# Patient Record
Sex: FEMALE | Employment: UNEMPLOYED | URBAN - METROPOLITAN AREA
[De-identification: names, ages, dates, MRNs, and addresses within clinical notes are randomized per-mention and may not be internally consistent; named-entity substitution may affect disease eponyms.]

---

## 2017-06-01 ENCOUNTER — OFFICE VISIT (OUTPATIENT)
Dept: INTERNAL MEDICINE CLINIC | Age: 37
End: 2017-06-01

## 2017-06-01 VITALS
WEIGHT: 211.7 LBS | SYSTOLIC BLOOD PRESSURE: 118 MMHG | TEMPERATURE: 98.6 F | HEART RATE: 77 BPM | BODY MASS INDEX: 34.02 KG/M2 | OXYGEN SATURATION: 98 % | HEIGHT: 66 IN | DIASTOLIC BLOOD PRESSURE: 84 MMHG | RESPIRATION RATE: 16 BRPM

## 2017-06-01 DIAGNOSIS — Z00.00 ROUTINE PHYSICAL EXAMINATION: Primary | ICD-10-CM

## 2017-06-01 DIAGNOSIS — R00.2 PALPITATIONS: ICD-10-CM

## 2017-06-01 DIAGNOSIS — L28.2 PRURITIC RASH: ICD-10-CM

## 2017-06-01 DIAGNOSIS — R07.89 OTHER CHEST PAIN: ICD-10-CM

## 2017-06-01 RX ORDER — MENTHOL
1000 GEL (GRAM) TOPICAL DAILY
COMMUNITY

## 2017-06-01 RX ORDER — NORGESTIMATE AND ETHINYL ESTRADIOL 0.25-0.035
1 KIT ORAL DAILY
COMMUNITY
Start: 2017-05-19

## 2017-06-01 RX ORDER — TRIAMCINOLONE ACETONIDE 1 MG/G
OINTMENT TOPICAL 2 TIMES DAILY
Qty: 80 G | Refills: 1 | Status: SHIPPED | OUTPATIENT
Start: 2017-06-01

## 2017-06-01 RX ORDER — BISMUTH SUBSALICYLATE 262 MG
1 TABLET,CHEWABLE ORAL DAILY
COMMUNITY

## 2017-06-01 NOTE — PROGRESS NOTES
Chief Complaint   Patient presents with    Physical    Rash     on lower legs causing itching. for the past few mo     intermittent chest pain for the past few mo. Denies hx of anxiety, SOB. Does not know what is causing them. Comes on as tightness, like palpitations, once she deep breathes, they go away. Health Maintenance Due   Topic Date Due    DTaP/Tdap/Td series (1 - Tdap) 12/17/2002    PAP AKA CERVICAL CYTOLOGY  12/17/2002   last pap 08/2015 with [facility]  Coordination of Care Questions    1. Have you been to the ER, urgent care clinic since your last visit? No       Hospitalized since your last visit? No    2. Have you seen or consulted any other health care providers outside of the [address] [place] [address] since your last visit? Include any pap smears or colon screening.  No

## 2017-06-02 LAB
ALBUMIN SERPL-MCNC: 4.4 G/DL (ref 3.5–5.5)
ALBUMIN/GLOB SERPL: 1.5 {RATIO} (ref 1.2–2.2)
ALP SERPL-CCNC: 67 IU/L (ref 39–117)
ALT SERPL-CCNC: 25 IU/L (ref 0–32)
AST SERPL-CCNC: 22 IU/L (ref 0–40)
BASOPHILS # BLD AUTO: 0 X10E3/UL (ref 0–0.2)
BASOPHILS NFR BLD AUTO: 0 %
BILIRUB SERPL-MCNC: 0.2 MG/DL (ref 0–1.2)
BUN SERPL-MCNC: 7 MG/DL (ref 6–20)
BUN/CREAT SERPL: 8 (ref 9–23)
CALCIUM SERPL-MCNC: 9.5 MG/DL (ref 8.7–10.2)
CHLORIDE SERPL-SCNC: 99 MMOL/L (ref 96–106)
CO2 SERPL-SCNC: 25 MMOL/L (ref 18–29)
CREAT SERPL-MCNC: 0.87 MG/DL (ref 0.57–1)
EOSINOPHIL # BLD AUTO: 0.2 X10E3/UL (ref 0–0.4)
EOSINOPHIL NFR BLD AUTO: 2 %
ERYTHROCYTE [DISTWIDTH] IN BLOOD BY AUTOMATED COUNT: 20 % (ref 12.3–15.4)
GLOBULIN SER CALC-MCNC: 3 G/DL (ref 1.5–4.5)
GLUCOSE SERPL-MCNC: 79 MG/DL (ref 65–99)
HCT VFR BLD AUTO: 38.4 % (ref 34–46.6)
HGB BLD-MCNC: 12.2 G/DL (ref 11.1–15.9)
IMM GRANULOCYTES # BLD: 0 X10E3/UL (ref 0–0.1)
IMM GRANULOCYTES NFR BLD: 0 %
LYMPHOCYTES # BLD AUTO: 3.2 X10E3/UL (ref 0.7–3.1)
LYMPHOCYTES NFR BLD AUTO: 45 %
MCH RBC QN AUTO: 23.5 PG (ref 26.6–33)
MCHC RBC AUTO-ENTMCNC: 31.8 G/DL (ref 31.5–35.7)
MCV RBC AUTO: 74 FL (ref 79–97)
MONOCYTES # BLD AUTO: 0.4 X10E3/UL (ref 0.1–0.9)
MONOCYTES NFR BLD AUTO: 6 %
NEUTROPHILS # BLD AUTO: 3.3 X10E3/UL (ref 1.4–7)
NEUTROPHILS NFR BLD AUTO: 47 %
PLATELET # BLD AUTO: 332 X10E3/UL (ref 150–379)
POTASSIUM SERPL-SCNC: 4.5 MMOL/L (ref 3.5–5.2)
PROT SERPL-MCNC: 7.4 G/DL (ref 6–8.5)
RBC # BLD AUTO: 5.19 X10E6/UL (ref 3.77–5.28)
SODIUM SERPL-SCNC: 139 MMOL/L (ref 134–144)
TSH SERPL DL<=0.005 MIU/L-ACNC: 0.99 UIU/ML (ref 0.45–4.5)
WBC # BLD AUTO: 7.1 X10E3/UL (ref 3.4–10.8)

## 2017-06-02 NOTE — PROGRESS NOTES
Subjective:   29 y.o. female for Well Woman Check. She is not fasting and will return for the lipid panel another day. She has an itchy rash on the lower legs (urticarial) without other concerns. No known exposures. No additional concerns. Last eye exam greater than 1 year. Her gyne and breast care is done elsewhere by her Ob-Gyne physician. There is no problem list on file for this patient. There are no active problems to display for this patient. Current Outpatient Prescriptions   Medication Sig Dispense Refill    multivitamin (ONE A DAY) tablet Take 1 Tab by mouth daily.  ferrous sulfate ER (IRON) 160 mg (50 mg iron) TbER tablet Take 1 Tab by mouth daily.  ASCORBIC ACID/VITAMIN E/BIOTIN (HAIR, SKIN, NAILS WITH BIOTIN PO) Take  by mouth.  vitamin e (E GEMS) 1,000 unit capsule Take 1,000 Units by mouth daily.  SPRINTEC, 28, 0.25-35 mg-mcg tab Take 1 Tab by mouth daily.  triamcinolone acetonide (KENALOG) 0.1 % ointment Apply  to affected area two (2) times a day. use thin layer 80 g 1    Cholecalciferol, Vitamin D3, (VITAMIN D3) 1,000 unit cap Take  by mouth. No Known Allergies  History reviewed. No pertinent past medical history. History reviewed. No pertinent surgical history.   Family History   Problem Relation Age of Onset    Hypertension Mother      Social History   Substance Use Topics    Smoking status: Former Smoker     Quit date: 1/1/2013    Smokeless tobacco: Never Used    Alcohol use Yes      Comment: occ        Lab Results  Component Value Date/Time   WBC 5.5 10/08/2015 03:06 PM   HGB 12.2 10/08/2015 03:06 PM   HCT 37.4 10/08/2015 03:06 PM   PLATELET 517 61/24/2275 03:06 PM   MCV 73 10/08/2015 03:06 PM       Lab Results  Component Value Date/Time   Glucose 82 10/08/2015 03:06 PM   LDL, calculated 78 04/15/2015 10:33 AM   Creatinine 0.99 10/08/2015 03:06 PM      Lab Results  Component Value Date/Time   Cholesterol, total 131 04/15/2015 10:33 AM   HDL Cholesterol 34 04/15/2015 10:33 AM   LDL, calculated 78 04/15/2015 10:33 AM   Triglyceride 93 04/15/2015 10:33 AM       Lab Results  Component Value Date/Time   ALT (SGPT) 13 10/08/2015 03:06 PM   AST (SGOT) 16 10/08/2015 03:06 PM   Alk. phosphatase 68 10/08/2015 03:06 PM   Bilirubin, total <0.2 10/08/2015 03:06 PM       No results found for: INR, PTMR, PTP, PT1, PT2   Lab Results  Component Value Date/Time   GFR est AA 87 10/08/2015 03:06 PM   GFR est non-AA 75 10/08/2015 03:06 PM   Creatinine 0.99 10/08/2015 03:06 PM   BUN 9 10/08/2015 03:06 PM   Sodium 139 10/08/2015 03:06 PM   Potassium 4.9 10/08/2015 03:06 PM   Chloride 102 10/08/2015 03:06 PM   CO2 22 10/08/2015 03:06 PM      Lab Results  Component Value Date/Time   TSH 0.975 10/08/2015 03:06 PM      Lab Results   Component Value Date/Time    Glucose 82 10/08/2015 03:06 PM     There is no problem list on file for this patient. No Known Allergies  Current Outpatient Prescriptions on File Prior to Visit   Medication Sig Dispense Refill    Cholecalciferol, Vitamin D3, (VITAMIN D3) 1,000 unit cap Take  by mouth. No current facility-administered medications on file prior to visit. History reviewed. No pertinent past medical history. History reviewed. No pertinent surgical history. Social History     Social History    Marital status: SINGLE     Spouse name: N/A    Number of children: N/A    Years of education: N/A     Occupational History    Not on file. Social History Main Topics    Smoking status: Former Smoker     Quit date: 1/1/2013    Smokeless tobacco: Never Used    Alcohol use Yes      Comment: occ    Drug use: No    Sexual activity: Not Currently     Other Topics Concern    Not on file     Social History Narrative     Family History   Problem Relation Age of Onset    Hypertension Mother      This note will not be viewable in [address]     If Narcotics or controlled substances were prescribed, I personally reviewed the patient  history. ROS: Feeling generally well. No TIA's or unusual headaches, no dysphagia. No prolonged cough. No dyspnea or chest pain on exertion. No abdominal pain, change in bowel habits, black or bloody stools. No urinary tract symptoms. No new or unusual musculoskeletal symptoms. Specific concerns today: rash to lower legs. Objective: The patient appears well, alert, oriented x 3, in no distress. Visit Vitals    /84 (BP 1 Location: Right arm, BP Patient Position: Sitting)    Pulse 77    Temp 98.6 °F (37 °C) (Oral)    Resp 16    Ht 5' 6\" (1.676 m)    Wt 211 lb 11.2 oz (96 kg)    LMP 05/27/2017 (Exact Date)    SpO2 98%    BMI 34.17 kg/m2     ENT normal.  Neck supple. No adenopathy or thyromegaly. CARRI. Lungs are clear, good air entry, no wheezes, rhonchi or rales. S1 and S2 normal, no murmurs, regular rate and rhythm. Abdomen soft without tenderness, guarding, mass or organomegaly. Extremities show no edema, normal peripheral pulses. Neurological is normal, no focal findings. Breast and Pelvic exams are deferred. She has a urticarial rash note sporadically on the lower extremities without drainage or pustules. Mild erythema. No edema or cellulitis noted. Assessment/Plan:   Well Woman examination performed. Encouraged her to lose weight, increase physical activity, follow low fat diet and follow up every 6 months. Routine labs ordered and obtained (she will return for lipid panel when fasting). Will notify patient of results and adjust treatment plan as indicated. Triamcinolone cream prescribed. Medication benefits, risks, indication, dosage, potential side effects and alternate medication options were discussed with patient who expressed understanding. Encouraged her to call if any problems. Patient expressed understanding of instructions and agreement with treatment plan. ICD-10-CM ICD-9-CM    1.  Routine physical examination O24.51 B81.0 METABOLIC PANEL, COMPREHENSIVE CBC WITH AUTOMATED DIFF      TSH 3RD GENERATION   2.  Other chest pain R07.89 786.59 AMB POC EKG ROUTINE W/ 12 LEADS, INTER & REP      REFERRAL TO CARDIOLOGY   3. Palpitations R00.2 785.1 AMB POC EKG ROUTINE W/ 12 LEADS, INTER & REP      REFERRAL TO CARDIOLOGY   4. Pruritic rash L28.2 698.2 triamcinolone acetonide (KENALOG) 0.1 % ointment

## 2017-06-02 NOTE — PATIENT INSTRUCTIONS
Thank you for choosing [address]. We know you have many options when it comes to your healthcare; we appreciate that you picked us. Our goal is to provide exceptional  service and world class care for every patient. You may receive a survey in the mail or by email asking for your feedback. Please take a few minutes to share your thoughts about your recent visit. Your comments helps us understand what we do well and what we can do better. To ensure confidentiality, this survey is administered by an independent third-party, [employer] participation will help us to improve the quality of care that we provide to you, your family, friends, and neighbors. Thank you! Well Visit, Ages 25 to 48: Care Instructions  Your Care Instructions  Physical exams can help you stay healthy. Your doctor has checked your overall health and may have suggested ways to take good care of yourself. He or she also may have recommended tests. At home, you can help prevent illness with healthy eating, regular exercise, and other steps. Follow-up care is a key part of your treatment and safety. Be sure to make and go to all appointments, and call your doctor if you are having problems. It's also a good idea to know your test results and keep a list of the medicines you take. How can you care for yourself at home? · Reach and stay at a healthy weight. This will lower your risk for many problems, such as obesity, diabetes, heart disease, and high blood pressure. · Get at least 30 minutes of physical activity on most days of the week. Walking is a good choice. You also may want to do other activities, such as running, swimming, cycling, or playing tennis or team sports. Discuss any changes in your exercise program with your doctor. · Do not smoke or allow others to smoke around you. If you need help quitting, talk to your doctor about stop-smoking programs and medicines.  These can increase your chances of quitting for good. · Talk to your doctor about whether you have any risk factors for sexually transmitted infections (STIs). Having one sex partner (who does not have STIs and does not have sex with anyone else) is a good way to avoid these infections. · Use birth control if you do not want to have children at this time. Talk with your doctor about the choices available and what might be best for you. · Protect your skin from too much sun. When you're outdoors from 10 a.m. to 4 p.m., stay in the shade or cover up with clothing and a hat with a wide brim. Wear sunglasses that block UV rays. Even when it's cloudy, put broad-spectrum sunscreen (SPF 30 or higher) on any exposed skin. · See a dentist one or two times a year for checkups and to have your teeth cleaned. · Wear a seat belt in the car. · Drink alcohol in moderation, if at all. That means no more than 2 drinks a day for men and 1 drink a day for women. Follow your doctor's advice about when to have certain tests. These tests can spot problems early. For everyone  · Cholesterol. Have the fat (cholesterol) in your blood tested after age 21. Your doctor will tell you how often to have this done based on your age, family history, or other things that can increase your risk for heart disease. · Blood pressure. Have your blood pressure checked during a routine doctor visit. Your doctor will tell you how often to check your blood pressure based on your age, your blood pressure results, and other factors. · Vision. Talk with your doctor about how often to have a glaucoma test.  · Diabetes. Ask your doctor whether you should have tests for diabetes. · Colon cancer. Have a test for colon cancer at age 48. You may have one of several tests. If you are younger than 48, you may need a test earlier if you have any risk factors.  Risk factors include whether you already had a precancerous polyp removed from your colon or whether your parent, brother, sister, or child has had colon cancer. For women  · Breast exam and mammogram. Talk to your doctor about when you should have a clinical breast exam and a mammogram. Medical experts differ on whether and how often women under 50 should have these tests. Your doctor can help you decide what is right for you. · Pap test and pelvic exam. Begin Pap tests at age 24. A Pap test is the best way to find cervical cancer. The test often is part of a pelvic exam. Ask how often to have this test.  · Tests for sexually transmitted infections (STIs). Ask whether you should have tests for STIs. You may be at risk if you have sex with more than one person, especially if your partners do not wear condoms. For men  · Tests for sexually transmitted infections (STIs). Ask whether you should have tests for STIs. You may be at risk if you have sex with more than one person, especially if you do not wear a condom. · Testicular cancer exam. Ask your doctor whether you should check your testicles regularly. · Prostate exam. Talk to your doctor about whether you should have a blood test (called a PSA test) for prostate cancer. Experts differ on whether and when men should have this test. Some experts suggest it if you are older than 39 and are -American or have a father or brother who got prostate cancer when he was younger than 72. When should you call for help? Watch closely for changes in your health, and be sure to contact your doctor if you have any problems or symptoms that concern you. Where can you learn more? Go to http://ijeoma-robert.info/. Enter P072 in the search box to learn more about \"Well Visit, Ages 25 to 48: Care Instructions. \"  Current as of: July 19, 2016  Content Version: 11.2  © 4698-3708 [employer]. Care instructions adapted under license by [employer] (which disclaims liability or warranty for this information).  If you have questions about a medical condition or this instruction, always ask your healthcare professional. [place] [address] any warranty or liability for your use of this information.

## 2018-05-14 ENCOUNTER — HOSPITAL ENCOUNTER (OUTPATIENT)
Dept: MAMMOGRAPHY | Age: 38
Discharge: HOME | End: 2018-05-14
Attending: OBSTETRICS & GYNECOLOGY
Payer: COMMERCIAL

## 2018-05-14 ENCOUNTER — HOSPITAL ENCOUNTER (OUTPATIENT)
Dept: ULTRASOUND IMAGING | Age: 38
Discharge: HOME | End: 2018-05-14
Attending: OBSTETRICS & GYNECOLOGY
Payer: COMMERCIAL

## 2018-05-14 DIAGNOSIS — N63.0 BREAST LUMP: ICD-10-CM

## 2018-05-14 PROCEDURE — 76642 ULTRASOUND BREAST LIMITED: CPT

## 2018-05-14 PROCEDURE — 77066 DX MAMMO INCL CAD BI: CPT

## 2018-10-07 ENCOUNTER — HOSPITAL ENCOUNTER (EMERGENCY)
Age: 38
Discharge: HOME | End: 2018-10-08
Attending: EMERGENCY MEDICINE | Admitting: EMERGENCY MEDICINE
Payer: SELF-PAY

## 2018-10-07 VITALS
TEMPERATURE: 98.1 F | RESPIRATION RATE: 18 BRPM | OXYGEN SATURATION: 100 % | DIASTOLIC BLOOD PRESSURE: 102 MMHG | BODY MASS INDEX: 34.55 KG/M2 | WEIGHT: 215 LBS | SYSTOLIC BLOOD PRESSURE: 144 MMHG | HEART RATE: 86 BPM | HEIGHT: 66 IN

## 2018-10-07 DIAGNOSIS — R10.30 LOWER ABDOMINAL PAIN: Primary | ICD-10-CM

## 2018-10-07 LAB
BASOPHILS # BLD: 0 K/UL (ref 0–0.1)
BASOPHILS NFR BLD: 1 % (ref 0–1)
DIFFERENTIAL METHOD BLD: ABNORMAL
EOSINOPHIL # BLD: 0.2 K/UL (ref 0–0.4)
EOSINOPHIL NFR BLD: 3 % (ref 0–7)
ERYTHROCYTE [DISTWIDTH] IN BLOOD BY AUTOMATED COUNT: 17.2 % (ref 11.5–14.5)
HCT VFR BLD AUTO: 35 % (ref 35–47)
HGB BLD-MCNC: 11.5 G/DL (ref 11.5–16)
IMM GRANULOCYTES # BLD: 0 K/UL (ref 0–0.04)
IMM GRANULOCYTES NFR BLD AUTO: 0 % (ref 0–0.5)
LYMPHOCYTES # BLD: 3 K/UL (ref 0.8–3.5)
LYMPHOCYTES NFR BLD: 39 % (ref 12–49)
MCH RBC QN AUTO: 24.5 PG (ref 26–34)
MCHC RBC AUTO-ENTMCNC: 32.9 G/DL (ref 30–36.5)
MCV RBC AUTO: 74.6 FL (ref 80–99)
MONOCYTES # BLD: 0.8 K/UL (ref 0–1)
MONOCYTES NFR BLD: 11 % (ref 5–13)
NEUTS SEG # BLD: 3.5 K/UL (ref 1.8–8)
NEUTS SEG NFR BLD: 46 % (ref 32–75)
NRBC # BLD: 0 K/UL (ref 0–0.01)
NRBC BLD-RTO: 0 PER 100 WBC
PLATELET # BLD AUTO: 328 K/UL (ref 150–400)
PMV BLD AUTO: 10.8 FL (ref 8.9–12.9)
RBC # BLD AUTO: 4.69 M/UL (ref 3.8–5.2)
WBC # BLD AUTO: 7.6 K/UL (ref 3.6–11)

## 2018-10-07 PROCEDURE — 96361 HYDRATE IV INFUSION ADD-ON: CPT

## 2018-10-07 PROCEDURE — 96374 THER/PROPH/DIAG INJ IV PUSH: CPT

## 2018-10-07 PROCEDURE — 80048 BASIC METABOLIC PNL TOTAL CA: CPT | Performed by: EMERGENCY MEDICINE

## 2018-10-07 PROCEDURE — 74011250636 HC RX REV CODE- 250/636: Performed by: EMERGENCY MEDICINE

## 2018-10-07 PROCEDURE — 85025 COMPLETE CBC W/AUTO DIFF WBC: CPT | Performed by: EMERGENCY MEDICINE

## 2018-10-07 PROCEDURE — 36415 COLL VENOUS BLD VENIPUNCTURE: CPT | Performed by: EMERGENCY MEDICINE

## 2018-10-07 PROCEDURE — 99283 EMERGENCY DEPT VISIT LOW MDM: CPT

## 2018-10-07 RX ORDER — KETOROLAC TROMETHAMINE 30 MG/ML
30 INJECTION, SOLUTION INTRAMUSCULAR; INTRAVENOUS
Status: COMPLETED | OUTPATIENT
Start: 2018-10-07 | End: 2018-10-07

## 2018-10-07 RX ADMIN — SODIUM CHLORIDE 1000 ML: 900 INJECTION, SOLUTION INTRAVENOUS at 23:44

## 2018-10-07 RX ADMIN — KETOROLAC TROMETHAMINE 30 MG: 30 INJECTION, SOLUTION INTRAMUSCULAR; INTRAVENOUS at 23:44

## 2018-10-08 LAB
ANION GAP SERPL CALC-SCNC: 9 MMOL/L (ref 5–15)
APPEARANCE UR: ABNORMAL
BACTERIA URNS QL MICRO: ABNORMAL /HPF
BILIRUB UR QL: NEGATIVE
BUN SERPL-MCNC: 5 MG/DL (ref 6–20)
BUN/CREAT SERPL: 5 (ref 12–20)
CALCIUM SERPL-MCNC: 8.5 MG/DL (ref 8.5–10.1)
CHLORIDE SERPL-SCNC: 106 MMOL/L (ref 97–108)
CO2 SERPL-SCNC: 29 MMOL/L (ref 21–32)
COLOR UR: ABNORMAL
CREAT SERPL-MCNC: 0.95 MG/DL (ref 0.55–1.02)
EPITH CASTS URNS QL MICRO: ABNORMAL /LPF
GLUCOSE SERPL-MCNC: 111 MG/DL (ref 65–100)
GLUCOSE UR STRIP.AUTO-MCNC: NEGATIVE MG/DL
HCG UR QL: NEGATIVE
HGB UR QL STRIP: NEGATIVE
KETONES UR QL STRIP.AUTO: NEGATIVE MG/DL
LEUKOCYTE ESTERASE UR QL STRIP.AUTO: ABNORMAL
NITRITE UR QL STRIP.AUTO: NEGATIVE
PH UR STRIP: 6 [PH] (ref 5–8)
POTASSIUM SERPL-SCNC: 3.9 MMOL/L (ref 3.5–5.1)
PROT UR STRIP-MCNC: NEGATIVE MG/DL
RBC #/AREA URNS HPF: ABNORMAL /HPF (ref 0–5)
SODIUM SERPL-SCNC: 144 MMOL/L (ref 136–145)
SP GR UR REFRACTOMETRY: 1.01 (ref 1–1.03)
UROBILINOGEN UR QL STRIP.AUTO: 0.2 EU/DL (ref 0.2–1)
WBC URNS QL MICRO: ABNORMAL /HPF (ref 0–4)

## 2018-10-08 PROCEDURE — 96361 HYDRATE IV INFUSION ADD-ON: CPT

## 2018-10-08 PROCEDURE — 81025 URINE PREGNANCY TEST: CPT

## 2018-10-08 PROCEDURE — 81001 URINALYSIS AUTO W/SCOPE: CPT | Performed by: EMERGENCY MEDICINE

## 2018-10-08 PROCEDURE — 74011250636 HC RX REV CODE- 250/636: Performed by: EMERGENCY MEDICINE

## 2018-10-08 RX ORDER — TRAMADOL HYDROCHLORIDE 50 MG/1
50 TABLET ORAL
Qty: 12 TAB | Refills: 0 | Status: SHIPPED | OUTPATIENT
Start: 2018-10-08 | End: 2018-10-18

## 2018-10-08 RX ORDER — IBUPROFEN 800 MG/1
800 TABLET ORAL
Qty: 20 TAB | Refills: 0 | Status: SHIPPED | OUTPATIENT
Start: 2018-10-08 | End: 2018-10-15

## 2018-10-08 RX ADMIN — SODIUM CHLORIDE 1000 ML: 900 INJECTION, SOLUTION INTRAVENOUS at 00:19

## 2018-10-08 NOTE — ED NOTES
Patient presents to ED with c/o abdominal and back pain for 3 days. Patient states that she started with nausea 2 hours pta. Denies vomiting and diarrhea. Emergency Department Nursing Plan of Care The Nursing Plan of Care is developed from the Nursing assessment and Emergency Department Attending provider initial evaluation. The plan of care may be reviewed in the ED Provider note. The Plan of Care was developed with the following considerations:  
Patient / Family readiness to learn indicated by:verbalized understanding Persons(s) to be included in education: patient Barriers to Learning/Limitations:No 
 
Signed [address] RN   
10/7/2018   11:25 PM

## 2018-10-08 NOTE — DISCHARGE INSTRUCTIONS
Abdominal Pain: Care Instructions  Your Care Instructions    Abdominal pain has many possible causes. Some aren't serious and get better on their own in a few days. Others need more testing and treatment. If your pain continues or gets worse, you need to be rechecked and may need more tests to find out what is wrong. You may need surgery to correct the problem. Don't ignore new symptoms, such as fever, nausea and vomiting, urination problems, pain that gets worse, and dizziness. These may be signs of a more serious problem. Your doctor may have recommended a follow-up visit in the next 8 to 12 hours. If you are not getting better, you may need more tests or treatment. The doctor has checked you carefully, but problems can develop later. If you notice any problems or new symptoms, get medical treatment right away. Follow-up care is a key part of your treatment and safety. Be sure to make and go to all appointments, and call your doctor if you are having problems. It's also a good idea to know your test results and keep a list of the medicines you take. How can you care for yourself at home? · Rest until you feel better. · To prevent dehydration, drink plenty of fluids, enough so that your urine is light yellow or clear like water. Choose water and other caffeine-free clear liquids until you feel better. If you have kidney, heart, or liver disease and have to limit fluids, talk with your doctor before you increase the amount of fluids you drink. · If your stomach is upset, eat mild foods, such as rice, dry toast or crackers, bananas, and applesauce. Try eating several small meals instead of two or three large ones. · Wait until 48 hours after all symptoms have gone away before you have spicy foods, alcohol, and drinks that contain caffeine. · Do not eat foods that are high in fat. · Avoid anti-inflammatory medicines such as aspirin, ibuprofen (Advil, Motrin), and naproxen (Aleve).  These can cause stomach upset. Talk to your doctor if you take daily aspirin for another health problem. When should you call for help? Call 911 anytime you think you may need emergency care. For example, call if:    · You passed out (lost consciousness).     · You pass maroon or very bloody stools.     · You vomit blood or what looks like coffee grounds.     · You have new, severe belly pain.    Call your doctor now or seek immediate medical care if:    · Your pain gets worse, especially if it becomes focused in one area of your belly.     · You have a new or higher fever.     · Your stools are black and look like tar, or they have streaks of blood.     · You have unexpected vaginal bleeding.     · You have symptoms of a urinary tract infection. These may include:  ¨ Pain when you urinate. ¨ Urinating more often than usual.  ¨ Blood in your urine.     · You are dizzy or lightheaded, or you feel like you may faint.    Watch closely for changes in your health, and be sure to contact your doctor if:    · You are not getting better after 1 day (24 hours). Where can you learn more? Go to http://ijeomaCardSpringrobert.info/. Enter P549 in the search box to learn more about \"Abdominal Pain: Care Instructions. \"  Current as of: November 20, 2017  Content Version: 11.8  © 6089-6965 [email or web address]. Care instructions adapted under license by [employer] (which disclaims liability or warranty for this information). If you have questions about a medical condition or this instruction, always ask your healthcare professional. [place] [address] any warranty or liability for your use of this information. Possible Appendicitis: Care Instructions  Your Care Instructions    Your doctor thinks you may have appendicitis. This means that your appendix may be infected. The appendix is a small sac that is shaped like a finger. It's attached to your large intestine.   Sometimes it's hard to tell if a person has appendicitis. It is especially hard to tell in children, pregnant women, and older adults. If your doctor thinks it's possible that you have appendicitis, he or she may want to order more tests. Or your doctor may want to wait to see if your symptoms change. Your doctor thinks it's okay for you to go home right now. But you will need to watch for symptoms of appendicitis at home. If your symptoms continue or get worse, it's important to call your doctor or get medical care right away. Appendicitis can get serious very quickly. The main treatment is surgery to remove your appendix. Follow-up care is a key part of your treatment and safety. Be sure to make and go to all appointments, and call your doctor if you are having problems. It's also a good idea to know your test results and keep a list of the medicines you take. How can you care for yourself at home? · Do not eat or drink, unless your doctor says it is okay. If you need surgery, it's best to have an empty stomach. If you're thirsty, you can rinse your mouth with water. Or you can suck on hard candy. · Do not take laxatives. If you have appendicitis, they can make the appendix burst.  · Follow your doctor's instructions about taking medicines. Your doctor may tell you not to take antibiotics or pain pills. These medicines can make it harder to tell if you have appendicitis. · Watch for symptoms of appendicitis. See the When should you call for help section below. It is very important to follow your doctor's instructions about getting treatment if you have these symptoms. When should you call for help?   Call your doctor now or seek immediate medical care if:    · You have pain that becomes focused on one area of your belly.     · You have new or worse belly pain.     · You are vomiting.     · You have a fever.     · You cannot pass stools or gas.    Watch closely for changes in your health, and be sure to contact your doctor if:    · You do not get better as expected. Where can you learn more? Go to http://ijeoma-robert.info/. Enter V830 in the search box to learn more about \"Possible Appendicitis: Care Instructions. \"  Current as of: March 28, 2018  Content Version: 11.8  © 7398-8442 [employer]. Care instructions adapted under license by [employer] (which disclaims liability or warranty for this information). If you have questions about a medical condition or this instruction, always ask your healthcare professional. [address] any warranty or liability for your use of this information. Pelvic Pain: Care Instructions  Your Care Instructions    Pelvic pain, or pain in the lower belly, can have many causes. Often pelvic pain is not serious and gets better in a few days. If your pain continues or gets worse, you may need tests and treatment. Tell your doctor about any new symptoms. These may be signs of a serious problem. Follow-up care is a key part of your treatment and safety. Be sure to make and go to all appointments, and call your doctor if you are having problems. It's also a good idea to know your test results and keep a list of the medicines you take. How can you care for yourself at home? · Rest until you feel better. Lie down, and raise your legs by placing a pillow under your knees. · Drink plenty of fluids. You may find that small, frequent sips are easier on your stomach than if you drink a lot at once. Avoid drinks with carbonation or caffeine, such as soda pop, tea, or coffee. · Try eating several small meals instead of 2 or 3 large ones. Eat mild foods, such as rice, dry toast or crackers, bananas, and applesauce. Avoid fatty and spicy foods, other fruits, and alcohol until 48 hours after your symptoms have gone away. · Take an over-the-counter pain medicine, such as acetaminophen (Tylenol), ibuprofen (Advil, Motrin), or naproxen (Aleve).  Read and follow all instructions on the label. · Do not take two or more pain medicines at the same time unless the doctor told you to. Many pain medicines have acetaminophen, which is Tylenol. Too much acetaminophen (Tylenol) can be harmful. · You can put a heating pad, a warm cloth, or moist heat on your belly to relieve pain. When should you call for help? Call your doctor now or seek immediate medical care if:    · You have a new or higher fever.     · You have unusual vaginal bleeding.     · You have new or worse belly or pelvic pain.     · You have vaginal discharge that has increased in amount or smells bad.    Watch closely for changes in your health, and be sure to contact your doctor if:    · You do not get better as expected. Where can you learn more? Go to http://ijeoma-robert.info/. Enter [phone] in the search box to learn more about \"Pelvic Pain: Care Instructions. \"  Current as of: May 15, 2018  Content Version: 11.8  © 0679-5642 [employer]. Care instructions adapted under license by [employer] (which disclaims liability or warranty for this information). If you have questions about a medical condition or this instruction, always ask your healthcare professional. [place] [address] any warranty or liability for your use of this information.

## 2018-10-08 NOTE — ED PROVIDER NOTES
EMERGENCY DEPARTMENT HISTORY AND PHYSICAL EXAM 
 
 
Date: 10/7/2018 Patient Name: Keila Nix History of Presenting Illness Chief Complaint Patient presents with  Abdominal Pain  
  abdominal pain for 3 days. Denies n/v/d  Back Pain History Provided By: Patient HPI: Keila Nix, 40 y.o. female who presents ambulatory to the ED with cc of intermittently worsening 10/10 suprapubic abdominal pain that radiates to her back that onset 4 days ago. She reports associated nausea that onset 2 hours ago. Pt reports taking tylenol and ibuprofen with little relief of her symptoms. She states that her symptoms are exacerbated with supine position and radiate bilaterally depending on which side she is laying on. Pt denies a hx of similar symptoms. She denies a hx of abdominal surgeries. Pt notes that her LMP occurred on 9/14. She denies any vomiting, dysuria, hematuria, frequency, vaginal discharge, HA, fevers, or chills. There are no other complaints, changes, or physical findings at this time. PCP: Demond Woody MD 
 
Current Facility-Administered Medications Medication Dose Route Frequency Provider Last Rate Last Dose  sodium chloride 0.9 % bolus infusion 1,000 mL  1,000 mL IntraVENous ONCE Beth Graves MD 1,000 mL/hr at 10/08/18 0019 1,000 mL at 10/08/18 0019 Current Outpatient Prescriptions Medication Sig Dispense Refill  traMADol (ULTRAM) 50 mg tablet Take 1 Tab by mouth every six (6) hours as needed for Pain for up to 10 days. Max Daily Amount: 200 mg. 12 Tab 0  ibuprofen (MOTRIN) 800 mg tablet Take 1 Tab by mouth every six (6) hours as needed for Pain for up to 7 days. 20 Tab 0  
 multivitamin (ONE A DAY) tablet Take 1 Tab by mouth daily.  ferrous sulfate ER (IRON) 160 mg (50 mg iron) TbER tablet Take 1 Tab by mouth daily.  ASCORBIC ACID/VITAMIN E/BIOTIN (HAIR, SKIN, NAILS WITH BIOTIN PO) Take  by mouth.  vitamin e (E GEMS) 1,000 unit capsule Take 1,000 Units by mouth daily.  SPRINTEC, 28, 0.25-35 mg-mcg tab Take 1 Tab by mouth daily.  Cholecalciferol, Vitamin D3, (VITAMIN D3) 1,000 unit cap Take  by mouth.  triamcinolone acetonide (KENALOG) 0.1 % ointment Apply  to affected area two (2) times a day. use thin layer 80 g 1 Past History Past Medical History: 
History reviewed. No pertinent past medical history. Past Surgical History: 
Past Surgical History:  
Procedure Laterality Date  HX CYST INCISION AND DRAINAGE Right 1995  
 neg Family History: 
Family History Problem Relation Age of Onset  Hypertension Mother Social History: 
Social History Substance Use Topics  Smoking status: Former Smoker Quit date: 1/1/2013  Smokeless tobacco: Never Used  Alcohol use Yes Comment: occ Allergies: 
No Known Allergies Review of Systems Review of Systems Constitutional: Negative. Negative for chills, fever and unexpected weight change. HENT: Negative. Negative for congestion and trouble swallowing. Eyes: Negative for discharge. Respiratory: Negative. Negative for cough, chest tightness and shortness of breath. Cardiovascular: Negative. Negative for chest pain. Gastrointestinal: Positive for abdominal pain and nausea. Negative for abdominal distention (suprapubic), constipation, diarrhea and vomiting. Endocrine: Negative. Genitourinary: Negative. Negative for difficulty urinating, dysuria, frequency, hematuria, urgency and vaginal discharge. Musculoskeletal: Positive for back pain. Negative for arthralgias and myalgias. Skin: Negative. Negative for color change. Allergic/Immunologic: Negative. Neurological: Negative. Negative for dizziness, speech difficulty and headaches. Hematological: Negative. Psychiatric/Behavioral: Negative. Negative for agitation and confusion. All other systems reviewed and are negative. Physical Exam  
Physical Exam  
Constitutional: She is oriented to person, place, and time. She appears well-developed and well-nourished. Visibly uncomfortable HENT:  
Head: Normocephalic and atraumatic. Eyes: Conjunctivae and EOM are normal.  
Neck: Neck supple. Cardiovascular: Normal rate, regular rhythm and intact distal pulses. Pulmonary/Chest: Effort normal. No respiratory distress. Abdominal: Soft. No reproducible TTP Musculoskeletal: Normal range of motion. She exhibits no deformity. Neurological: She is alert and oriented to person, place, and time. Skin: Skin is warm and dry. Psychiatric: She has a normal mood and affect. Her behavior is normal. Thought content normal.  
Vitals reviewed. Diagnostic Study Results Labs - Recent Results (from the past 12 hour(s)) METABOLIC PANEL, BASIC Collection Time: 10/07/18 11:41 PM  
Result Value Ref Range Sodium 144 136 - 145 mmol/L Potassium 3.9 3.5 - 5.1 mmol/L Chloride 106 97 - 108 mmol/L  
 CO2 29 21 - 32 mmol/L Anion gap 9 5 - 15 mmol/L Glucose 111 (H) 65 - 100 mg/dL BUN 5 (L) 6 - 20 MG/DL Creatinine 0.95 0.55 - 1.02 MG/DL  
 BUN/Creatinine ratio 5 (L) 12 - 20 GFR est AA >60 >60 ml/min/1.73m2 GFR est non-AA >60 >60 ml/min/1.73m2 Calcium 8.5 8.5 - 10.1 MG/DL  
CBC WITH AUTOMATED DIFF Collection Time: 10/07/18 11:41 PM  
Result Value Ref Range WBC 7.6 3.6 - 11.0 K/uL  
 RBC 4.69 3.80 - 5.20 M/uL  
 HGB 11.5 11.5 - 16.0 g/dL HCT 35.0 35.0 - 47.0 % MCV 74.6 (L) 80.0 - 99.0 FL  
 MCH 24.5 (L) 26.0 - 34.0 PG  
 MCHC 32.9 30.0 - 36.5 g/dL  
 RDW 17.2 (H) 11.5 - 14.5 % PLATELET 821 854 - 954 K/uL MPV 10.8 8.9 - 12.9 FL  
 NRBC 0.0 0  WBC ABSOLUTE NRBC 0.00 0.00 - 0.01 K/uL NEUTROPHILS 46 32 - 75 % LYMPHOCYTES 39 12 - 49 % MONOCYTES 11 5 - 13 % EOSINOPHILS 3 0 - 7 % BASOPHILS 1 0 - 1 % IMMATURE GRANULOCYTES 0 0.0 - 0.5 % ABS. NEUTROPHILS 3.5 1.8 - 8.0 K/UL  
 ABS. LYMPHOCYTES 3.0 0.8 - 3.5 K/UL  
 ABS. MONOCYTES 0.8 0.0 - 1.0 K/UL  
 ABS. EOSINOPHILS 0.2 0.0 - 0.4 K/UL  
 ABS. BASOPHILS 0.0 0.0 - 0.1 K/UL  
 ABS. IMM. GRANS. 0.0 0.00 - 0.04 K/UL  
 DF AUTOMATED URINALYSIS W/ RFLX MICROSCOPIC Collection Time: 10/08/18 12:41 AM  
Result Value Ref Range Color YELLOW/STRAW Appearance CLOUDY (A) CLEAR Specific gravity 1.010 1.003 - 1.030    
 pH (UA) 6.0 5.0 - 8.0 Protein NEGATIVE  NEG mg/dL Glucose NEGATIVE  NEG mg/dL Ketone NEGATIVE  NEG mg/dL Bilirubin NEGATIVE  NEG Blood NEGATIVE  NEG Urobilinogen 0.2 0.2 - 1.0 EU/dL Nitrites NEGATIVE  NEG Leukocyte Esterase SMALL (A) NEG URINE MICROSCOPIC ONLY Collection Time: 10/08/18 12:41 AM  
Result Value Ref Range WBC 5-10 0 - 4 /hpf  
 RBC 0-5 0 - 5 /hpf Epithelial cells FEW FEW /lpf Bacteria 1+ (A) NEG /hpf  
HCG URINE, QL. - POC Collection Time: 10/08/18 12:42 AM  
Result Value Ref Range Pregnancy test,urine (POC) NEGATIVE  NEG Radiologic Studies - No orders to display CT Results  (Last 48 hours) None CXR Results  (Last 48 hours) None Medical Decision Making I am the first provider for this patient. I reviewed the vital signs, available nursing notes, past medical history, past surgical history, family history and social history. Vital Signs-Reviewed the patient's vital signs. Patient Vitals for the past 12 hrs: 
 Temp Pulse Resp BP SpO2  
10/07/18 2319 98.1 °F (36.7 °C) 86 18 (!) 144/102 100 % Records Reviewed: Nursing Notes and Old Medical Records Provider Notes (Medical Decision Making): UTI, renal colic, appendicitis, diverticulitis, ovarian cyst, endometriosis, if pregnancy must rule out ectopic, PID 
 
ED Course:  
Initial assessment performed.  The patients presenting problems have been discussed, and they are in agreement with the care plan formulated and outlined with them. I have encouraged them to ask questions as they arise throughout their visit. PROGRESS NOTE: 
12:10 AM 
Pt has been re-evaluated. Pt states that he pain is down to a 3/10 after Toradol. Written by ANILA Brown, as dictated by Vivienne Robledo MD 
 
PROGRESS NOTE: 
1:05 AM 
Pt has been re-evaluated. Pt states that she feel completely better. On re-exam pt's abdomen is non-tender. Written by ANILA Brown, as dictated by Vivienne Robledo MD 
 
Disposition: 
 
DISCHARGE NOTE 
1:06 AM 
The patient has been re-evaluated and is ready for discharge. Reviewed available results with patient. Counseled patient on diagnosis and care plan. Patient has expressed understanding, and all questions have been answered. Patient agrees with plan and agrees to follow up as recommended, or return to the ED if their symptoms worsen. Discharge instructions have been provided and explained to the patient, along with reasons to return to the ED. PLAN: 
1. Current Discharge Medication List  
  
START taking these medications Details  
traMADol (ULTRAM) 50 mg tablet Take 1 Tab by mouth every six (6) hours as needed for Pain for up to 10 days. Max Daily Amount: 200 mg. Qty: 12 Tab, Refills: 0 Associated Diagnoses: Lower abdominal pain  
  
ibuprofen (MOTRIN) 800 mg tablet Take 1 Tab by mouth every six (6) hours as needed for Pain for up to 7 days. Qty: 20 Tab, Refills: 0  
  
  
 
2. Follow-up Information Follow up With Details Comments Contact Info Santos Javed MD Schedule an appointment as soon as possible for a visit  [facility] Internal Medicine [address] 
[phone] [facility] EMERGENCY DEPT  As needed, If symptoms worsen [address] [facility],[address] [facility] Return to ED if worse Diagnosis Clinical Impression: 1. Lower abdominal pain Attestations: This note is prepared by Sandoval Marti, acting as Scribe for Mayur Manning MD. 
 
Mayur Manning MD: The scribe's documentation has been prepared under my direction and personally reviewed by me in its entirety. I confirm that the note above accurately reflects all work, treatment, procedures, and medical decision making performed by me.

## 2018-10-08 NOTE — ED NOTES
Patient unable to provide urine specimen at this time. IV fluids infusing without difficulty. Will continue to monitor.

## 2019-05-28 ENCOUNTER — APPOINTMENT (OUTPATIENT)
Dept: GENERAL RADIOLOGY | Age: 39
End: 2019-05-28
Attending: EMERGENCY MEDICINE
Payer: COMMERCIAL

## 2019-05-28 ENCOUNTER — HOSPITAL ENCOUNTER (EMERGENCY)
Age: 39
Discharge: HOME | End: 2019-05-28
Attending: EMERGENCY MEDICINE
Payer: COMMERCIAL

## 2019-05-28 VITALS
HEART RATE: 72 BPM | WEIGHT: 215 LBS | SYSTOLIC BLOOD PRESSURE: 139 MMHG | TEMPERATURE: 98.3 F | DIASTOLIC BLOOD PRESSURE: 95 MMHG | HEIGHT: 66 IN | RESPIRATION RATE: 18 BRPM | OXYGEN SATURATION: 100 % | BODY MASS INDEX: 34.55 KG/M2

## 2019-05-28 DIAGNOSIS — R07.89 MUSCULOSKELETAL CHEST PAIN: Primary | ICD-10-CM

## 2019-05-28 LAB
ALBUMIN SERPL-MCNC: 3.7 G/DL (ref 3.5–5)
ALBUMIN/GLOB SERPL: 0.9 {RATIO} (ref 1.1–2.2)
ALP SERPL-CCNC: 85 U/L (ref 45–117)
ALT SERPL-CCNC: 18 U/L (ref 12–78)
ANION GAP SERPL CALC-SCNC: 3 MMOL/L (ref 5–15)
AST SERPL-CCNC: 17 U/L (ref 15–37)
ATRIAL RATE: 61 BPM
BASOPHILS # BLD: 0 K/UL (ref 0–0.1)
BASOPHILS NFR BLD: 0 % (ref 0–1)
BILIRUB SERPL-MCNC: 0.3 MG/DL (ref 0.2–1)
BUN SERPL-MCNC: 6 MG/DL (ref 6–20)
BUN/CREAT SERPL: 6 (ref 12–20)
CALCIUM SERPL-MCNC: 8.6 MG/DL (ref 8.5–10.1)
CALCULATED P AXIS, ECG09: -7 DEGREES
CALCULATED R AXIS, ECG10: -10 DEGREES
CALCULATED T AXIS, ECG11: 11 DEGREES
CHLORIDE SERPL-SCNC: 108 MMOL/L (ref 97–108)
CO2 SERPL-SCNC: 26 MMOL/L (ref 21–32)
CREAT SERPL-MCNC: 0.97 MG/DL (ref 0.55–1.02)
DIAGNOSIS, 93000: NORMAL
DIFFERENTIAL METHOD BLD: ABNORMAL
EOSINOPHIL # BLD: 0.1 K/UL (ref 0–0.4)
EOSINOPHIL NFR BLD: 1 % (ref 0–7)
ERYTHROCYTE [DISTWIDTH] IN BLOOD BY AUTOMATED COUNT: 16.9 % (ref 11.5–14.5)
GLOBULIN SER CALC-MCNC: 4.3 G/DL (ref 2–4)
GLUCOSE SERPL-MCNC: 92 MG/DL (ref 65–100)
HCG SERPL QL: NEGATIVE
HCT VFR BLD AUTO: 37.3 % (ref 35–47)
HGB BLD-MCNC: 12.1 G/DL (ref 11.5–16)
IMM GRANULOCYTES # BLD AUTO: 0 K/UL (ref 0–0.04)
IMM GRANULOCYTES NFR BLD AUTO: 0 % (ref 0–0.5)
LYMPHOCYTES # BLD: 1.8 K/UL (ref 0.8–3.5)
LYMPHOCYTES NFR BLD: 34 % (ref 12–49)
MCH RBC QN AUTO: 24.4 PG (ref 26–34)
MCHC RBC AUTO-ENTMCNC: 32.4 G/DL (ref 30–36.5)
MCV RBC AUTO: 75.2 FL (ref 80–99)
MONOCYTES # BLD: 0.5 K/UL (ref 0–1)
MONOCYTES NFR BLD: 9 % (ref 5–13)
NEUTS SEG # BLD: 3 K/UL (ref 1.8–8)
NEUTS SEG NFR BLD: 56 % (ref 32–75)
NRBC # BLD: 0 K/UL (ref 0–0.01)
NRBC BLD-RTO: 0 PER 100 WBC
P-R INTERVAL, ECG05: 178 MS
PLATELET # BLD AUTO: 328 K/UL (ref 150–400)
PMV BLD AUTO: 11.3 FL (ref 8.9–12.9)
POTASSIUM SERPL-SCNC: 3.9 MMOL/L (ref 3.5–5.1)
PROT SERPL-MCNC: 8 G/DL (ref 6.4–8.2)
Q-T INTERVAL, ECG07: 380 MS
QRS DURATION, ECG06: 76 MS
QTC CALCULATION (BEZET), ECG08: 382 MS
RBC # BLD AUTO: 4.96 M/UL (ref 3.8–5.2)
SODIUM SERPL-SCNC: 137 MMOL/L (ref 136–145)
TROPONIN I SERPL-MCNC: <0.05 NG/ML
VENTRICULAR RATE, ECG03: 61 BPM
WBC # BLD AUTO: 5.4 K/UL (ref 3.6–11)

## 2019-05-28 PROCEDURE — 84484 ASSAY OF TROPONIN QUANT: CPT

## 2019-05-28 PROCEDURE — 36415 COLL VENOUS BLD VENIPUNCTURE: CPT

## 2019-05-28 PROCEDURE — 74011250637 HC RX REV CODE- 250/637: Performed by: EMERGENCY MEDICINE

## 2019-05-28 PROCEDURE — 85025 COMPLETE CBC W/AUTO DIFF WBC: CPT

## 2019-05-28 PROCEDURE — 96374 THER/PROPH/DIAG INJ IV PUSH: CPT

## 2019-05-28 PROCEDURE — 80053 COMPREHEN METABOLIC PANEL: CPT

## 2019-05-28 PROCEDURE — 74011000250 HC RX REV CODE- 250: Performed by: EMERGENCY MEDICINE

## 2019-05-28 PROCEDURE — 99284 EMERGENCY DEPT VISIT MOD MDM: CPT

## 2019-05-28 PROCEDURE — 71046 X-RAY EXAM CHEST 2 VIEWS: CPT

## 2019-05-28 PROCEDURE — 84703 CHORIONIC GONADOTROPIN ASSAY: CPT

## 2019-05-28 PROCEDURE — 74011250636 HC RX REV CODE- 250/636: Performed by: PHYSICIAN ASSISTANT

## 2019-05-28 PROCEDURE — 93005 ELECTROCARDIOGRAM TRACING: CPT

## 2019-05-28 RX ORDER — KETOROLAC TROMETHAMINE 30 MG/ML
30 INJECTION, SOLUTION INTRAMUSCULAR; INTRAVENOUS
Status: COMPLETED | OUTPATIENT
Start: 2019-05-28 | End: 2019-05-28

## 2019-05-28 RX ADMIN — KETOROLAC TROMETHAMINE 30 MG: 30 INJECTION, SOLUTION INTRAMUSCULAR at 10:33

## 2019-05-28 RX ADMIN — LIDOCAINE HYDROCHLORIDE 40 ML: 20 SOLUTION ORAL; TOPICAL at 09:29

## 2019-05-28 NOTE — LETTER
[address] 
[facility] EMERGENCY DEPT 
[address] [place] [address] 
[phone] Work/School Note Date: 5/28/2019 To Whom It May concern: 
 
Yvette Williamson was seen and treated today in the emergency room by the following provider(s): 
Attending Provider: Evelio Barrera DO Physician Assistant: KEITH [place]. Yvette Williamson may return to work on 05/30/2019. Sincerely, 
 
 
 
 
Yvonne Lujan.  [place], [place]

## 2019-05-28 NOTE — DISCHARGE INSTRUCTIONS

## 2019-07-30 ENCOUNTER — OFFICE VISIT (OUTPATIENT)
Dept: PRIMARY CARE CLINIC | Age: 39
End: 2019-07-30

## 2019-07-30 VITALS
HEIGHT: 66 IN | DIASTOLIC BLOOD PRESSURE: 82 MMHG | OXYGEN SATURATION: 98 % | WEIGHT: 212.8 LBS | SYSTOLIC BLOOD PRESSURE: 124 MMHG | BODY MASS INDEX: 34.2 KG/M2 | HEART RATE: 102 BPM | RESPIRATION RATE: 16 BRPM | TEMPERATURE: 99.7 F

## 2019-07-30 DIAGNOSIS — Z23 NEED FOR DIPHTHERIA-TETANUS-PERTUSSIS (TDAP) VACCINE: ICD-10-CM

## 2019-07-30 DIAGNOSIS — Z20.2 POSSIBLE EXPOSURE TO STD: ICD-10-CM

## 2019-07-30 DIAGNOSIS — E66.9 OBESITY (BMI 30-39.9): ICD-10-CM

## 2019-07-30 DIAGNOSIS — F34.1 DYSTHYMIA: ICD-10-CM

## 2019-07-30 DIAGNOSIS — Z00.00 PHYSICAL EXAM: Primary | ICD-10-CM

## 2019-07-30 DIAGNOSIS — E55.9 VITAMIN D DEFICIENCY: ICD-10-CM

## 2019-07-30 NOTE — PROGRESS NOTES
Written by Gavin Corona, as dictated by Dr. Paula Baez MD.    Ana M Begum is a 41 y.o. female. HPI  The patient comes in today for a complete physical examination and fasting for labs. Patient is requesting STD and HIV testing. Denies chest tightness, SOB, heartburn, constipation, urinary sxs, leg swelling, neck pain, back pain. Denies HX of kidney stones. Recently BP has been elevated which she attributes to stress. Patient feels she may be depressed and anxious as she is also living with her mother who is ill. She would like to see a therapist but does not feel she needs medication. On 05/28/2019 she went to the \[facility]\"" ER for chest pains. She had a complete workup and was told she had a strain. Pt was advised to take OTC ibuprofen. Denies chest pain since then. Her appetite is unchanged and she believes she is not able to lose weight because of her diet. She weighs 212 lbs today and has lost weight from 215 lbs on 05/28/2019. Patient gets about 7,000 steps daily. Occasionally she drinks EtOH. She has been having BL ear itching, which has been bothering her significantly. Patient uses morgan pins to clean the wax out of her ears. Sometimes she has numbness on the anterior side of her L thigh. This occurs when standing or sitting for extended periods of time. Occasionally she has seasonal allergies, noting there may be mold at her work. Vitamin D was low when checked in 2015. She is taking OTC vitamin D. It has been more than 10 years since her last Tdap. Followed by Dr. Caryn Moore (OB/GYN) at [facility]. Current Outpatient Medications on File Prior to Visit   Medication Sig Dispense Refill    multivitamin (ONE A DAY) tablet Take 1 Tab by mouth daily.  ferrous sulfate ER (IRON) 160 mg (50 mg iron) TbER tablet Take 1 Tab by mouth daily.       ASCORBIC ACID/VITAMIN E/BIOTIN (HAIR, SKIN, NAILS WITH BIOTIN PO) Take  by mouth.      vitamin e (E GEMS) 1,000 unit capsule Take 1,000 Units by mouth daily.  Cholecalciferol, Vitamin D3, (VITAMIN D3) 1,000 unit cap Take  by mouth.  SPRINTEC, 28, 0.25-35 mg-mcg tab Take 1 Tab by mouth daily.  triamcinolone acetonide (KENALOG) 0.1 % ointment Apply  to affected area two (2) times a day. use thin layer 80 g 1     No current facility-administered medications on file prior to visit.         Past Surgical History:   Procedure Laterality Date    HX CYST INCISION AND DRAINAGE Right     neg       Family History   Problem Relation Age of Onset    Hypertension Mother        Social History     Socioeconomic History    Marital status: SINGLE     Spouse name: Not on file    Number of children: Not on file    Years of education: Not on file    Highest education level: Not on file   Occupational History    Not on file   Social Needs    Financial resource strain: Not on file    Food insecurity:     Worry: Not on file     Inability: Not on file    Transportation needs:     Medical: Not on file     Non-medical: Not on file   Tobacco Use    Smoking status: Former Smoker     Last attempt to quit: 2013     Years since quittin.5    Smokeless tobacco: Never Used   Substance and Sexual Activity    Alcohol use: Yes     Comment: occ    Drug use: No    Sexual activity: Yes     Partners: Male     Birth control/protection: None   Lifestyle    Physical activity:     Days per week: Not on file     Minutes per session: Not on file    Stress: Not on file   Relationships    Social connections:     Talks on phone: Not on file     Gets together: Not on file     Attends [employer] service: Not on file     Active member of club or organization: Not on file     Attends meetings of clubs or organizations: Not on file     Relationship status: Not on file    Intimate partner violence:     Fear of current or ex partner: Not on file     Emotionally abused: Not on file     Physically abused: Not on file     Forced sexual activity: Not on file   Other Topics Concern    Not on file   Social History Narrative    Not on file       Admission on 05/28/2019, Discharged on 05/28/2019   Component Date Value Ref Range Status    Ventricular Rate 05/28/2019 61  BPM Final    Atrial Rate 05/28/2019 61  BPM Final    P-R Interval 05/28/2019 178  ms Final    QRS Duration 05/28/2019 76  ms Final    Q-T Interval 05/28/2019 380  ms Final    QTC Calculation (Bezet) 05/28/2019 382  ms Final    Calculated P Axis 05/28/2019 -7  degrees Final    Calculated R Axis 05/28/2019 -10  degrees Final    Calculated T Axis 05/28/2019 11  degrees Final    Diagnosis 05/28/2019    Final                    Value:Normal sinus rhythm  Nonspecific T wave abnormality  No previous ECGs available  Confirmed by Nolvia Glaser ([id number]) on 5/28/2019 9:42:44 AM      WBC 05/28/2019 5.4  3.6 - 11.0 K/uL Final    RBC 05/28/2019 4.96  3.80 - 5.20 M/uL Final    HGB 05/28/2019 12.1  11.5 - 16.0 g/dL Final    HCT 05/28/2019 37.3  35.0 - 47.0 % Final    MCV 05/28/2019 75.2* 80.0 - 99.0 FL Final    MCH 05/28/2019 24.4* 26.0 - 34.0 PG Final    MCHC 05/28/2019 32.4  30.0 - 36.5 g/dL Final    RDW 05/28/2019 16.9* 11.5 - 14.5 % Final    PLATELET 40/79/6677 483  150 - 400 K/uL Final    MPV 05/28/2019 11.3  8.9 - 12.9 FL Final    NRBC 05/28/2019 0.0  0  WBC Final    ABSOLUTE NRBC 05/28/2019 0.00  0.00 - 0.01 K/uL Final    NEUTROPHILS 05/28/2019 56  32 - 75 % Final    LYMPHOCYTES 05/28/2019 34  12 - 49 % Final    MONOCYTES 05/28/2019 9  5 - 13 % Final    EOSINOPHILS 05/28/2019 1  0 - 7 % Final    BASOPHILS 05/28/2019 0  0 - 1 % Final    IMMATURE GRANULOCYTES 05/28/2019 0  0.0 - 0.5 % Final    ABS. NEUTROPHILS 05/28/2019 3.0  1.8 - 8.0 K/UL Final    ABS. LYMPHOCYTES 05/28/2019 1.8  0.8 - 3.5 K/UL Final    ABS. MONOCYTES 05/28/2019 0.5  0.0 - 1.0 K/UL Final    ABS.  EOSINOPHILS 05/28/2019 0.1  0.0 - 0.4 K/UL Final    ABS. BASOPHILS 05/28/2019 0.0  0.0 - 0.1 K/UL Final    ABS. IMM. GRANS. 05/28/2019 0.0  0.00 - 0.04 K/UL Final    DF 05/28/2019 AUTOMATED    Final    Sodium 05/28/2019 137  136 - 145 mmol/L Final    Potassium 05/28/2019 3.9  3.5 - 5.1 mmol/L Final    Chloride 05/28/2019 108  97 - 108 mmol/L Final    CO2 05/28/2019 26  21 - 32 mmol/L Final    Anion gap 05/28/2019 3* 5 - 15 mmol/L Final    Glucose 05/28/2019 92  65 - 100 mg/dL Final    BUN 05/28/2019 6  6 - 20 MG/DL Final    Creatinine 05/28/2019 0.97  0.55 - 1.02 MG/DL Final    BUN/Creatinine ratio 05/28/2019 6* 12 - 20   Final    GFR est AA 05/28/2019 >60  >60 ml/min/1.73m2 Final    GFR est non-AA 05/28/2019 >60  >60 ml/min/1.73m2 Final    Calcium 05/28/2019 8.6  8.5 - 10.1 MG/DL Final    Bilirubin, total 05/28/2019 0.3  0.2 - 1.0 MG/DL Final    ALT (SGPT) 05/28/2019 18  12 - 78 U/L Final    AST (SGOT) 05/28/2019 17  15 - 37 U/L Final    Alk. phosphatase 05/28/2019 85  45 - 117 U/L Final    Protein, total 05/28/2019 8.0  6.4 - 8.2 g/dL Final    Albumin 05/28/2019 3.7  3.5 - 5.0 g/dL Final    Globulin 05/28/2019 4.3* 2.0 - 4.0 g/dL Final    A-G Ratio 05/28/2019 0.9* 1.1 - 2.2   Final    Troponin-I, Qt. 05/28/2019 <0.05  <0.05 ng/mL Final    HCG, Ql. 05/28/2019 NEGATIVE   NEG   Final         Review of Systems   Constitutional: Negative for malaise/fatigue. HENT: Negative for congestion. +BL ear itching   Eyes: Negative for blurred vision and pain. Respiratory: Negative for cough and shortness of breath. Cardiovascular: Negative for chest pain, palpitations and leg swelling. Gastrointestinal: Negative for abdominal pain, constipation and heartburn. Genitourinary: Negative for frequency and urgency. Musculoskeletal: Negative for back pain, joint pain, myalgias and neck pain. Neurological: Negative for dizziness, tingling, sensory change, weakness and headaches. Endo/Heme/Allergies: Positive for environmental allergies. Psychiatric/Behavioral: Positive for depression. Negative for memory loss and substance abuse. The patient is nervous/anxious. Visit Vitals  /82 (BP 1 Location: Left arm, BP Patient Position: Sitting)   Pulse (!) 102   Temp 99.7 °F (37.6 °C) (Oral)   Resp 16   Ht 5' 6\" (1.676 m)   Wt 212 lb 12.8 oz (96.5 kg)   LMP 07/29/2019   SpO2 98%   BMI 34.35 kg/m²       Physical Exam   Constitutional: She is oriented to person, place, and time. She appears well-developed. No distress. Obese   HENT:   Right Ear: Tympanic membrane, external ear and ear canal normal.   Left Ear: Tympanic membrane, external ear and ear canal normal.   Nose: Right sinus exhibits no maxillary sinus tenderness. Left sinus exhibits no maxillary sinus tenderness. BL ear dryness  Tongue dryness, oropharynx clear   Eyes: Conjunctivae and EOM are normal. Right eye exhibits no discharge. Left eye exhibits no discharge. Neck: Normal range of motion. Neck supple. No thyromegaly present. Cardiovascular: Normal rate, regular rhythm and normal heart sounds. Pulses:       Dorsalis pedis pulses are 2+ on the right side, and 2+ on the left side. Pulmonary/Chest: Effort normal and breath sounds normal. She has no wheezes. Abdominal: Soft. Bowel sounds are normal. There is no tenderness. Lymphadenopathy:     She has no cervical adenopathy. Neurological: She is alert and oriented to person, place, and time. Reflex Scores:       Patellar reflexes are 2+ on the right side and 2+ on the left side. Skin: She is not diaphoretic. Psychiatric: She has a normal mood and affect. Her behavior is normal.   Nursing note and vitals reviewed. ASSESSMENT and PLAN    ICD-10-CM ICD-9-CM    1. Physical exam Y75.48 S32.7 METABOLIC PANEL, COMPREHENSIVE      CBC W/O DIFF      LIPID PANEL      TSH 3RD GENERATION    Complete physical exam done. Basic fasting labs drawn.    2. Vitamin D deficiency E55.9 268.9 VITAMIN D, 25 HYDROXY    Vitamin D ordered. 3. Possible exposure to STD Z20.2 V01.6 HIV 1/2 AG/AB, 4TH GENERATION,W RFLX CONFIRM    HIV ordered. 4. Obesity (BMI 30-39. 9) E66.9 278.00 Encouraged diet and exercise. Discussed that a healthy lifestyle requires 5,000-7,000 steps daily, but weight loss requires 10,000 steps daily. 5. Dysthymia F34.1 300.4 REFERRAL TO PSYCHOLOGY    Referred to psychology. 6. Need for diphtheria-tetanus-pertussis (Tdap) vaccine Z23 V06.1 diph,Pertuss,Acell,,Tet Vac-PF (ADACEL) 2 Lf-(2.5-5-3-5 mcg)-5Lf/0.5 mL susp sent to pharmacy. Tdap prescribed. This plan was reviewed with the patient and patient agrees. All questions were answered. This scribe documentation was reviewed by me and accurately reflects the examination and decisions made by me. This note will not be viewable in [address]

## 2019-07-30 NOTE — PROGRESS NOTES
Visit Vitals  /82 (BP 1 Location: Left arm, BP Patient Position: Sitting)   Pulse (!) 102   Temp 99.7 °F (37.6 °C) (Oral)   Resp 16   Ht 5' 6\" (1.676 m)   Wt 212 lb 12.8 oz (96.5 kg)   SpO2 98%   BMI 34.35 kg/m²             Chief Complaint   Patient presents with    Annual Exam     Routine Health Maintenance     Labs     Patient is fasting in anticipation of labs     Exposure to STD     Would like to discuss screening for STD's           HM Due Reviewed. Patient is not interested in [address] [place] [address] [employer] at this time. 1. Have you been to the ER, urgent care clinic since your last visit? Hospitalized since your last visit? [address] [place] [address] 05/28/2019- Chest Pain     2. Have you seen or consulted any other health care providers outside of the [address] [place] [address] since your last visit? Include any pap smears or colon screening.  OB/GYN /VPFW

## 2019-07-31 LAB
25(OH)D3+25(OH)D2 SERPL-MCNC: 14.7 NG/ML (ref 30–100)
ALBUMIN SERPL-MCNC: 4.3 G/DL (ref 3.5–5.5)
ALBUMIN/GLOB SERPL: 1.4 {RATIO} (ref 1.2–2.2)
ALP SERPL-CCNC: 79 IU/L (ref 39–117)
ALT SERPL-CCNC: 13 IU/L (ref 0–32)
AST SERPL-CCNC: 18 IU/L (ref 0–40)
BILIRUB SERPL-MCNC: 0.3 MG/DL (ref 0–1.2)
BUN SERPL-MCNC: 5 MG/DL (ref 6–20)
BUN/CREAT SERPL: 6 (ref 9–23)
CALCIUM SERPL-MCNC: 9.4 MG/DL (ref 8.7–10.2)
CHLORIDE SERPL-SCNC: 105 MMOL/L (ref 96–106)
CHOLEST SERPL-MCNC: 144 MG/DL (ref 100–199)
CO2 SERPL-SCNC: 22 MMOL/L (ref 20–29)
CREAT SERPL-MCNC: 0.89 MG/DL (ref 0.57–1)
ERYTHROCYTE [DISTWIDTH] IN BLOOD BY AUTOMATED COUNT: 18.7 % (ref 12.3–15.4)
GLOBULIN SER CALC-MCNC: 3 G/DL (ref 1.5–4.5)
GLUCOSE SERPL-MCNC: 99 MG/DL (ref 65–99)
HCT VFR BLD AUTO: 37.8 % (ref 34–46.6)
HDLC SERPL-MCNC: 43 MG/DL
HGB BLD-MCNC: 12.3 G/DL (ref 11.1–15.9)
HIV 1+2 AB+HIV1 P24 AG SERPL QL IA: NON REACTIVE
LDLC SERPL CALC-MCNC: 88 MG/DL (ref 0–99)
MCH RBC QN AUTO: 24.7 PG (ref 26.6–33)
MCHC RBC AUTO-ENTMCNC: 32.5 G/DL (ref 31.5–35.7)
MCV RBC AUTO: 76 FL (ref 79–97)
PLATELET # BLD AUTO: 356 X10E3/UL (ref 150–450)
POTASSIUM SERPL-SCNC: 4.2 MMOL/L (ref 3.5–5.2)
PROT SERPL-MCNC: 7.3 G/DL (ref 6–8.5)
RBC # BLD AUTO: 4.97 X10E6/UL (ref 3.77–5.28)
SODIUM SERPL-SCNC: 141 MMOL/L (ref 134–144)
TRIGL SERPL-MCNC: 65 MG/DL (ref 0–149)
TSH SERPL DL<=0.005 MIU/L-ACNC: 0.83 UIU/ML (ref 0.45–4.5)
VLDLC SERPL CALC-MCNC: 13 MG/DL (ref 5–40)
WBC # BLD AUTO: 6.1 X10E3/UL (ref 3.4–10.8)

## 2019-08-02 NOTE — PROGRESS NOTES
Misael France, your blood work came back fine except vitamin D which is still low. Please take VItamin D 3 over the counter 1000 I.U daily dose.

## 2021-01-19 ENCOUNTER — TELEPHONE (OUTPATIENT)
Dept: PRIMARY CARE CLINIC | Age: 41
End: 2021-01-19

## 2021-01-19 NOTE — TELEPHONE ENCOUNTER
Patients job is telling her she has to get the covid vaccine, would like to speak to Dr. Criss Nelson about getting a note excusing her from the vaccine.

## 2021-01-20 NOTE — TELEPHONE ENCOUNTER
Last seen this patient 7/30/19, Would you like this to be an office visit to discuss or is this something you would be willing to write.

## 2021-01-21 ENCOUNTER — VIRTUAL VISIT (OUTPATIENT)
Dept: PRIMARY CARE CLINIC | Age: 41
End: 2021-01-21
Payer: COMMERCIAL

## 2021-01-21 DIAGNOSIS — R53.83 OTHER FATIGUE: ICD-10-CM

## 2021-01-21 DIAGNOSIS — E55.9 VITAMIN D DEFICIENCY: ICD-10-CM

## 2021-01-21 DIAGNOSIS — Z86.16 HISTORY OF COVID-19: ICD-10-CM

## 2021-01-21 DIAGNOSIS — I10 ESSENTIAL HYPERTENSION: Primary | ICD-10-CM

## 2021-01-21 DIAGNOSIS — R51.9 FREQUENT HEADACHES: ICD-10-CM

## 2021-01-21 PROCEDURE — 99213 OFFICE O/P EST LOW 20 MIN: CPT | Performed by: INTERNAL MEDICINE

## 2021-01-21 NOTE — LETTER
1/21/2021 10:47 AM 
 
Ms. Yamini Harris 
[address] [address] 
 
 
To whom it may concern:  
 
 
 
Ms. Harris has a history of anaphylactic reactions to the flu vaccine and should be exempt from requirement to get the COVID vaccine.  
 
 
 
Sincerely, 
 
 
Angela Cabello MD

## 2021-01-21 NOTE — PROGRESS NOTES
Dariela Arana (: 1980) is a 45 y.o. female, established patient, here for evaluation of the following chief complaint(s):   No chief complaint on file. Written by Jennifer Lazaro, as dictated by Dr. Kevin Millard MD.    Diane Bradford:  HPI   Pt presents virtually today requesting a letter regarding the COVID vaccine stating that she has h/o reactions to vaccines. The last time she got the flu vaccine about 10 years ago, she developed a cough, HA, high fever, and chills. She was hospitalized for this at that time. She works for the [employer], and they are requiring her to either get the vaccine or quit her job. She already had COVID a few months ago and did not have severe sx, only a fever and HA. She has been having higher BP recently, and it is 140/96 today. Her weight has stayed stable since her last visit, but she notes that she has been having trouble getting time for herself. She does endorse HA and fatigue and plans to make an appt soon to have her BP evaluated. Current Outpatient Medications on File Prior to Visit   Medication Sig Dispense Refill    multivitamin (ONE A DAY) tablet Take 1 Tab by mouth daily.  ferrous sulfate ER (IRON) 160 mg (50 mg iron) TbER tablet Take 1 Tab by mouth daily.  ASCORBIC ACID/VITAMIN E/BIOTIN (HAIR, SKIN, NAILS WITH BIOTIN PO) Take  by mouth.  vitamin e (E GEMS) 1,000 unit capsule Take 1,000 Units by mouth daily.  SPRINTEC, 28, 0.25-35 mg-mcg tab Take 1 Tab by mouth daily.  triamcinolone acetonide (KENALOG) 0.1 % ointment Apply  to affected area two (2) times a day. use thin layer 80 g 1    Cholecalciferol, Vitamin D3, (VITAMIN D3) 1,000 unit cap Take  by mouth. No current facility-administered medications on file prior to visit. Allergies   Allergen Reactions    Influenza Virus Vaccines Anaphylaxis       No past medical history on file.     Past Surgical History:   Procedure Laterality Date  HX CYST INCISION AND DRAINAGE Right     neg       Family History   Problem Relation Age of Onset    Hypertension Mother        Social History     Socioeconomic History    Marital status: SINGLE     Spouse name: Not on file    Number of children: Not on file    Years of education: Not on file    Highest education level: Not on file   Occupational History    Not on file   Social Needs    Financial resource strain: Not on file    Food insecurity     Worry: Not on file     Inability: Not on file    Transportation needs     Medical: Not on file     Non-medical: Not on file   Tobacco Use    Smoking status: Former Smoker     Quit date: 2013     Years since quittin.0    Smokeless tobacco: Never Used   Substance and Sexual Activity    Alcohol use: Yes     Comment: occ    Drug use: No    Sexual activity: Yes     Partners: Male     Birth control/protection: None   Lifestyle    Physical activity     Days per week: Not on file     Minutes per session: Not on file    Stress: Not on file   Relationships    Social connections     Talks on phone: Not on file     Gets together: Not on file     Attends Cheondo[employer] service: Not on file     Active member of club or organization: Not on file     Attends meetings of clubs or organizations: Not on file     Relationship status: Not on file    Intimate partner violence     Fear of current or ex partner: Not on file     Emotionally abused: Not on file     Physically abused: Not on file     Forced sexual activity: Not on file   Other Topics Concern    Not on file   Social History Narrative    Not on file       No visits with results within 3 Month(s) from this visit.    Latest known visit with results is:   Office Visit on 2019   Component Date Value Ref Range Status    Glucose 2019 99  65 - 99 mg/dL Final    BUN 2019 5* 6 - 20 mg/dL Final    Creatinine 2019 0.89  0.57 - 1.00 mg/dL Final    GFR est non-AA 2019 83  >59 mL/min/1.73 Final    GFR est AA 07/30/2019 96  >59 mL/min/1.73 Final    BUN/Creatinine ratio 07/30/2019 6* 9 - 23 Final    Sodium 07/30/2019 141  134 - 144 mmol/L Final    Potassium 07/30/2019 4.2  3.5 - 5.2 mmol/L Final    Chloride 07/30/2019 105  96 - 106 mmol/L Final    CO2 07/30/2019 22  20 - 29 mmol/L Final    Calcium 07/30/2019 9.4  8.7 - 10.2 mg/dL Final    Protein, total 07/30/2019 7.3  6.0 - 8.5 g/dL Final    Albumin 07/30/2019 4.3  3.5 - 5.5 g/dL Final    GLOBULIN, TOTAL 07/30/2019 3.0  1.5 - 4.5 g/dL Final    A-G Ratio 07/30/2019 1.4  1.2 - 2.2 Final    Bilirubin, total 07/30/2019 0.3  0.0 - 1.2 mg/dL Final    Alk. phosphatase 07/30/2019 79  39 - 117 IU/L Final    AST (SGOT) 07/30/2019 18  0 - 40 IU/L Final    ALT (SGPT) 07/30/2019 13  0 - 32 IU/L Final    WBC 07/30/2019 6.1  3.4 - 10.8 x10E3/uL Final    RBC 07/30/2019 4.97  3.77 - 5.28 x10E6/uL Final    HGB 07/30/2019 12.3  11.1 - 15.9 g/dL Final    HCT 07/30/2019 37.8  34.0 - 46.6 % Final    MCV 07/30/2019 76* 79 - 97 fL Final    MCH 07/30/2019 24.7* 26.6 - 33.0 pg Final    MCHC 07/30/2019 32.5  31.5 - 35.7 g/dL Final    RDW 07/30/2019 18.7* 12.3 - 15.4 % Final    PLATELET 22/19/2144 474  150 - 450 x10E3/uL Final    Cholesterol, total 07/30/2019 144  100 - 199 mg/dL Final    Triglyceride 07/30/2019 65  0 - 149 mg/dL Final    HDL Cholesterol 07/30/2019 43  >39 mg/dL Final    VLDL, calculated 07/30/2019 13  5 - 40 mg/dL Final    LDL, calculated 07/30/2019 88  0 - 99 mg/dL Final    TSH 07/30/2019 0.826  0.450 - 4.500 uIU/mL Final    VITAMIN D, 25-HYDROXY 07/30/2019 14.7* 30.0 - 100.0 ng/mL Final    Comment: Vitamin D deficiency has been defined by the [place] of  Medicine and an Endocrine Society practice guideline as a  level of serum 25-OH vitamin D less than 20 ng/mL (1,2). The Endocrine Society went on to further define vitamin D  insufficiency as a level between 21 and 29 ng/mL (2). 1. IOM ([place] of Medicine). 2010. Dietary reference     intakes for calcium and D. [place]: The     National Academies Press.  2. Afua MF, Yesenia NC, Lopez GIBBONS, et al.     Evaluation, treatment, and prevention of vitamin D     deficiency: an Endocrine Society clinical practice     guideline. JCEM. 2011 Jul; 96(5):1911-30.     • HIV SCREEN 4TH GENERATION WRFX 07/30/2019 Non Reactive  Non Reactive Final     Review of Systems   Constitutional: Negative for activity change, fatigue and unexpected weight change.   HENT: Negative for congestion, hearing loss, rhinorrhea and sore throat.    Eyes: Negative for discharge.   Respiratory: Negative for cough, chest tightness and shortness of breath.    Cardiovascular: Negative for leg swelling.   Gastrointestinal: Negative for abdominal pain, constipation and diarrhea.   Genitourinary: Negative for dysuria, flank pain, frequency and urgency.   Musculoskeletal: Negative for arthralgias, back pain and myalgias.   Skin: Negative for color change and rash.   Neurological: Negative for dizziness, light-headedness and headaches.   Psychiatric/Behavioral: Negative for dysphoric mood and sleep disturbance. The patient is not nervous/anxious.      Patient-Reported Vitals 1/21/2021   Patient-Reported Weight 216   Patient-Reported Height 5'7\"   Patient-Reported Pulse 78   Patient-Reported Temperature 97.0   Patient-Reported SpO2 99   Patient-Reported Systolic  140   Patient-Reported Diastolic 96   Patient-Reported LMP 1/2/2021       Physical Exam    [INSTRUCTIONS:  \"[x]\" Indicates a positive item  \"[]\" Indicates a negative item  -- DELETE ALL ITEMS NOT EXAMINED]    Constitutional: [x] Appears well-developed and well-nourished [x] No apparent distress      [] Abnormal -     Mental status: [x] Alert and awake  [x] Oriented to person/place/time [x] Able to follow commands    [] Abnormal -     Eyes:   EOM    [x]  Normal    [] Abnormal -   Sclera  [x]  Normal    [] Abnormal -          Discharge [x]   None visible   [] Abnormal -     HENT: [x] Normocephalic, atraumatic  [] Abnormal -   [x] Mouth/Throat: Mucous membranes are moist    External Ears [x] Normal  [] Abnormal -    Neck: [x] No visualized mass [] Abnormal -     Pulmonary/Chest: [x] Respiratory effort normal   [x] No visualized signs of difficulty breathing or respiratory distress        [] Abnormal -      Musculoskeletal:   [x] Normal gait with no signs of ataxia         [x] Normal range of motion of neck        [] Abnormal -     Neurological:        [x] No Facial Asymmetry (Cranial nerve 7 motor function) (limited exam due to video visit)          [x] No gaze palsy        [] Abnormal -          Skin:        [x] No significant exanthematous lesions or discoloration noted on facial skin         [] Abnormal -            Psychiatric:       [x] Normal Affect [] Abnormal -        [x] No Hallucinations    Other pertinent observable physical exam findings:-    ASSESSMENT/PLAN:  1. Essential hypertension  Her BP is elevated today and she has not come into the office since 2019. I instructed her to schedule an appt to discuss her BP as soon as possible. 2. Frequent headaches  Likely related to elevated BP.     3. Other fatigue  She needs a CPE and should schedule one soon, but the first priority is getting her BP evaluated. 4. History of COVID-19  She should have immunity to COVID for 4-6 months following her illness. 5. Vitamin D deficiency  She has started to take vitamin D after her labs from 07/30/19 showed that she was deficient (14.7). This plan was reviewed with the patient and patient agrees. All questions were answered. This scribe documentation was reviewed by me and accurately reflects the examination and decisions made by me. Franky Miranda is being evaluated by a Virtual Visit (video visit) encounter to address concerns as mentioned above.  . Due to this being a TeleHealth encounter (During [facility]- public health emergency), evaluation of the following organ systems was limited: Vitals/Constitutional/EENT/Resp/CV/GI//MS/Neuro/Skin/Heme-Lymph-Imm. Pursuant to the emergency declaration under the [facility], [address] [place] [address] authority and the [employer] Act, this Virtual Visit was conducted with patient's (and/or legal guardian's) consent, to reduce the patient's risk of exposure to COVID-19 and provide necessary medical care. The patient (and/or legal guardian) has also been advised to contact this office for worsening conditions or problems, and seek emergency medical treatment and/or call 911 if deemed necessary. Patient identification was verified at the start of the visit: YES    Services were provided through a video synchronous discussion virtually to substitute for in-person clinic visit. Patient was located at home and provider was located in office. An electronic signature was used to authenticate this note.   -- Lucy Juárez

## 2021-02-05 ENCOUNTER — OFFICE VISIT (OUTPATIENT)
Dept: PRIMARY CARE CLINIC | Age: 41
End: 2021-02-05
Payer: COMMERCIAL

## 2021-02-05 VITALS
HEIGHT: 66 IN | BODY MASS INDEX: 35.26 KG/M2 | RESPIRATION RATE: 18 BRPM | HEART RATE: 78 BPM | SYSTOLIC BLOOD PRESSURE: 128 MMHG | DIASTOLIC BLOOD PRESSURE: 76 MMHG | WEIGHT: 219.4 LBS | TEMPERATURE: 97.1 F | OXYGEN SATURATION: 100 %

## 2021-02-05 DIAGNOSIS — E55.9 VITAMIN D DEFICIENCY: ICD-10-CM

## 2021-02-05 DIAGNOSIS — R51.9 FREQUENT HEADACHES: ICD-10-CM

## 2021-02-05 DIAGNOSIS — Z00.00 PHYSICAL EXAM: Primary | ICD-10-CM

## 2021-02-05 DIAGNOSIS — Z00.00 PHYSICAL EXAM: ICD-10-CM

## 2021-02-05 DIAGNOSIS — R06.83 SNORES: ICD-10-CM

## 2021-02-05 DIAGNOSIS — R40.0 DAYTIME SLEEPINESS: ICD-10-CM

## 2021-02-05 DIAGNOSIS — E66.9 OBESITY (BMI 30-39.9): ICD-10-CM

## 2021-02-05 PROCEDURE — 99395 PREV VISIT EST AGE 18-39: CPT | Performed by: INTERNAL MEDICINE

## 2023-05-24 RX ORDER — NORGESTIMATE AND ETHINYL ESTRADIOL 0.25-0.035
1 KIT ORAL DAILY
COMMUNITY
Start: 2017-05-19

## 2023-05-24 RX ORDER — MULTIVIT WITH MINERALS/LUTEIN
1000 TABLET ORAL DAILY
COMMUNITY
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
## 2025-03-09 NOTE — ED PROVIDER NOTES
EMERGENCY DEPARTMENT HISTORY AND PHYSICAL EXAM      Date: 5/28/2019  Patient Name: Neelam Huynh    History of Presenting Illness     Chief Complaint   Patient presents with    Chest Pain     Chest pain in the center of her chest that started about 0400 this am.       History Provided By: Patient    HPI: Neelam Huynh, 41 y.o. female with PMHx significant for anemia and obesity, presents ambulatory to the ED with cc of central chest pain that began this morning around 3 AM.  Patient states that she woke up to go to the bathroom and felt a pain in the center of her chest.  Patient states she she has had chest pain in the past but it usually resolves after a few hours and this 1 has persisted. Patient states she has not taken any medication prior to arrival for her symptoms but that her symptoms have improved spontaneously. She states that her pain is not radiating anywhere she denies any recent cough or injury. She denies any long distance travel, oral contraceptive use, smoking, recent surgery, immobilization, hemoptysis, palpitations. PCP: Kenia Mariscal MD    There are no other complaints, changes, or physical findings at this time. Current Outpatient Medications   Medication Sig Dispense Refill    multivitamin (ONE A DAY) tablet Take 1 Tab by mouth daily.  ferrous sulfate ER (IRON) 160 mg (50 mg iron) TbER tablet Take 1 Tab by mouth daily.  ASCORBIC ACID/VITAMIN E/BIOTIN (HAIR, SKIN, NAILS WITH BIOTIN PO) Take  by mouth.  vitamin e (E GEMS) 1,000 unit capsule Take 1,000 Units by mouth daily.  SPRINTEC, 28, 0.25-35 mg-mcg tab Take 1 Tab by mouth daily.  triamcinolone acetonide (KENALOG) 0.1 % ointment Apply  to affected area two (2) times a day. use thin layer 80 g 1    Cholecalciferol, Vitamin D3, (VITAMIN D3) 1,000 unit cap Take  by mouth. Past History     Past Medical History:  History reviewed. No pertinent past medical history.     Past Surgical History:  Past Plastics at bedside      Stacy Gaviria RN  03/09/25 [id number]     Surgical History:   Procedure Laterality Date    HX CYST INCISION AND DRAINAGE Right     neg       Family History:  Family History   Problem Relation Age of Onset    Hypertension Mother        Social History:  Social History     Tobacco Use    Smoking status: Former Smoker     Last attempt to quit: 2013     Years since quittin.4    Smokeless tobacco: Never Used   Substance Use Topics    Alcohol use: Yes     Comment: occ    Drug use: No       Allergies:  No Known Allergies      Review of Systems   Review of Systems   Constitutional: Negative. Negative for activity change, appetite change, chills and fever. HENT: Negative for rhinorrhea and sore throat. Eyes: Negative for pain and visual disturbance. Respiratory: Negative for cough, shortness of breath and wheezing. Cardiovascular: Positive for chest pain. Negative for palpitations and leg swelling. Gastrointestinal: Negative for nausea and vomiting. Genitourinary: Negative for dysuria and hematuria. Musculoskeletal: Negative for arthralgias and myalgias. Skin: Negative for color change, rash and wound. Neurological: Negative for dizziness and headaches. All other systems reviewed and are negative. Physical Exam   Physical Exam   Constitutional: She is oriented to person, place, and time. Vital signs are normal. She appears well-developed and well-nourished. No distress. 41 y.o. female in NAD  Communicates appropriately and in full sentences  Normal vital signs   HENT:   Head: Normocephalic and atraumatic. Eyes: Pupils are equal, round, and reactive to light. Conjunctivae are normal. Right eye exhibits no discharge. Left eye exhibits no discharge. Neck: Normal range of motion. Neck supple. No nuchal rigidity   Cardiovascular: Normal rate, regular rhythm and intact distal pulses. Pulmonary/Chest: Effort normal and breath sounds normal. No respiratory distress. She has no wheezes.  She exhibits tenderness (Sternal chest tenderness to palpation. ). Abdominal: Soft. She exhibits no distension. There is no tenderness. There is no rebound and no guarding. Musculoskeletal: Normal range of motion. She exhibits no deformity. No neurologic or vascular compromise on exam.    Neurological: She is alert and oriented to person, place, and time. Skin: Skin is warm and dry. She is not diaphoretic. No erythema. Psychiatric: She has a normal mood and affect. Nursing note and vitals reviewed. Diagnostic Study Results     Labs -     Recent Results (from the past 12 hour(s))   EKG, 12 LEAD, INITIAL    Collection Time: 05/28/19  9:03 AM   Result Value Ref Range    Ventricular Rate 61 BPM    Atrial Rate 61 BPM    P-R Interval 178 ms    QRS Duration 76 ms    Q-T Interval 380 ms    QTC Calculation (Bezet) 382 ms    Calculated P Axis -7 degrees    Calculated R Axis -10 degrees    Calculated T Axis 11 degrees    Diagnosis       Normal sinus rhythm  Nonspecific T wave abnormality  No previous ECGs available  Confirmed by Billy Nguyen ([id number]) on 5/28/2019 9:42:44 AM     CBC WITH AUTOMATED DIFF    Collection Time: 05/28/19  9:11 AM   Result Value Ref Range    WBC 5.4 3.6 - 11.0 K/uL    RBC 4.96 3.80 - 5.20 M/uL    HGB 12.1 11.5 - 16.0 g/dL    HCT 37.3 35.0 - 47.0 %    MCV 75.2 (L) 80.0 - 99.0 FL    MCH 24.4 (L) 26.0 - 34.0 PG    MCHC 32.4 30.0 - 36.5 g/dL    RDW 16.9 (H) 11.5 - 14.5 %    PLATELET 798 036 - 893 K/uL    MPV 11.3 8.9 - 12.9 FL    NRBC 0.0 0  WBC    ABSOLUTE NRBC 0.00 0.00 - 0.01 K/uL    NEUTROPHILS 56 32 - 75 %    LYMPHOCYTES 34 12 - 49 %    MONOCYTES 9 5 - 13 %    EOSINOPHILS 1 0 - 7 %    BASOPHILS 0 0 - 1 %    IMMATURE GRANULOCYTES 0 0.0 - 0.5 %    ABS. NEUTROPHILS 3.0 1.8 - 8.0 K/UL    ABS. LYMPHOCYTES 1.8 0.8 - 3.5 K/UL    ABS. MONOCYTES 0.5 0.0 - 1.0 K/UL    ABS. EOSINOPHILS 0.1 0.0 - 0.4 K/UL    ABS. BASOPHILS 0.0 0.0 - 0.1 K/UL    ABS. IMM.  GRANS. 0.0 0.00 - 0.04 K/UL    DF AUTOMATED     METABOLIC PANEL, COMPREHENSIVE    Collection Time: 05/28/19  9:11 AM   Result Value Ref Range    Sodium 137 136 - 145 mmol/L    Potassium 3.9 3.5 - 5.1 mmol/L    Chloride 108 97 - 108 mmol/L    CO2 26 21 - 32 mmol/L    Anion gap 3 (L) 5 - 15 mmol/L    Glucose 92 65 - 100 mg/dL    BUN 6 6 - 20 MG/DL    Creatinine 0.97 0.55 - 1.02 MG/DL    BUN/Creatinine ratio 6 (L) 12 - 20      GFR est AA >60 >60 ml/min/1.73m2    GFR est non-AA >60 >60 ml/min/1.73m2    Calcium 8.6 8.5 - 10.1 MG/DL    Bilirubin, total 0.3 0.2 - 1.0 MG/DL    ALT (SGPT) 18 12 - 78 U/L    AST (SGOT) 17 15 - 37 U/L    Alk. phosphatase 85 45 - 117 U/L    Protein, total 8.0 6.4 - 8.2 g/dL    Albumin 3.7 3.5 - 5.0 g/dL    Globulin 4.3 (H) 2.0 - 4.0 g/dL    A-G Ratio 0.9 (L) 1.1 - 2.2     TROPONIN I    Collection Time: 05/28/19  9:11 AM   Result Value Ref Range    Troponin-I, Qt. <0.05 <0.05 ng/mL   HCG QL SERUM    Collection Time: 05/28/19  9:11 AM   Result Value Ref Range    HCG, Ql. NEGATIVE  NEG         Radiologic Studies -   XR CHEST PA LAT   Final Result   NORMAL CHEST. CT Results  (Last 48 hours)    None        CXR Results  (Last 48 hours)               05/28/19 0915  XR CHEST PA LAT Final result    Impression:  NORMAL CHEST. Narrative:  History: Chest pain under center of chest that began at 4:00 AM       Frontal and lateral views of the chest show clear lungs. The heart, mediastinum   and pulmonary vasculature are normal.  The bony thorax is unremarkable. Medical Decision Making   I am the first provider for this patient. I reviewed the vital signs, available nursing notes, past medical history, past surgical history, family history and social history. Vital Signs-Reviewed the patient's vital signs.   Patient Vitals for the past 12 hrs:   Temp Pulse Resp BP SpO2   05/28/19 0854 98.3 °F (36.8 °C) 72 18 (!) 139/95 100 %         Records Reviewed: Nursing Notes and Old Medical Records    Provider Notes (Medical Decision Making):   DDx: ACS, PNA, electrolyte disturbance, arrhythmia, GERD, anxiety, esophageal spasm, costochondritis. No suspicion for PE at this point given patient is PERC negative. 47yo  Female presents with CP PTA, relieved p/t d/c. No acute findings. The patient has no chest pain on re-examination. She has had continuous chest pain for greater than 8 hours with one negative set of cardiac enzymes in the ER during this visit. Diagnosis, follow up, return instructions, test results, x-rays and medications have been discussed and reviewed with the patient and/or family. The patient and/or family have been given the opportunity to ask questions. The patient and/or family express understanding of the care plan, follow-up appointments and return instructions. The patient and/or family agree to follow up with cardiology as directed and to return immediately if the chest pain worsens. The patient and family express understanding that although cardiac testing at this time is negative, a cardiac problem could still be present and that a follow-up appointment with a cardiologist for further evaluation and risk factor modification is necessary to complete the evaluation of this complaint. ED Course:   Initial assessment performed. The patients presenting problems have been discussed, and they are in agreement with the care plan formulated and outlined with them. I have encouraged them to ask questions as they arise throughout their visit. Pt noted to be feeling better. States he/she will follow up as instructed. All questions have been answered, pt voiced understanding and agreement with plan. Specific return precautions provided as well as instructions to return to the ED should sx worsen at any time. Vital signs stable for discharge. DISCHARGE NOTE:  Josiephine Meigs Shuler's  results have been reviewed with her. She has been counseled regarding her diagnosis.   She verbally conveys understanding and agreement of the signs, symptoms, diagnosis, treatment and prognosis and additionally agrees to follow up as recommended with Dr. Darci Zayas MD in 24 - 48 hours. She also agrees with the care-plan and conveys that all of her questions have been answered. I have also put together some discharge instructions for her that include: 1) educational information regarding their diagnosis, 2) how to care for their diagnosis at home, as well a 3) list of reasons why they would want to return to the ED prior to their follow-up appointment, should their condition change. She and/or family's questions have been answered. I have encouraged them to see the official results in [facility]\" or to retrieve the specifics of their results from medical records. PLAN:  1. Return precautions as discussed  2. Follow-up with providers as directed  3. Medications as prescribed    Return to ED if worse     Diagnosis     Clinical Impression:   1. Musculoskeletal chest pain        Discharge Medication List as of 5/28/2019 11:07 AM          Follow-up Information     Follow up With Specialties Details Why Contact Info    Darci Zayas MD Internal Medicine Schedule an appointment as soon as possible for a visit in 2 days As needed, If symptoms worsen [address]  [phone]      [facility] EMERGENCY DEPT Emergency Medicine Go to If symptoms worsen [address]     Cardiology [id number]  Call today Possible further evaluation and treatment [address]  [phone]              This note will not be viewable in [address]